# Patient Record
Sex: FEMALE | Race: BLACK OR AFRICAN AMERICAN | NOT HISPANIC OR LATINO | Employment: FULL TIME | URBAN - METROPOLITAN AREA
[De-identification: names, ages, dates, MRNs, and addresses within clinical notes are randomized per-mention and may not be internally consistent; named-entity substitution may affect disease eponyms.]

---

## 2024-05-01 ENCOUNTER — HOSPITAL ENCOUNTER (EMERGENCY)
Facility: HOSPITAL | Age: 34
Discharge: HOME | End: 2024-05-01
Attending: EMERGENCY MEDICINE
Payer: COMMERCIAL

## 2024-05-01 VITALS
RESPIRATION RATE: 16 BRPM | HEART RATE: 84 BPM | HEIGHT: 69 IN | OXYGEN SATURATION: 100 % | TEMPERATURE: 99 F | WEIGHT: 272.63 LBS | DIASTOLIC BLOOD PRESSURE: 88 MMHG | BODY MASS INDEX: 40.38 KG/M2 | SYSTOLIC BLOOD PRESSURE: 131 MMHG

## 2024-05-01 DIAGNOSIS — M79.672 LEFT FOOT PAIN: Primary | ICD-10-CM

## 2024-05-01 LAB
B-HCG UR QL: NEGATIVE
CTP QC/QA: YES

## 2024-05-01 PROCEDURE — 99283 EMERGENCY DEPT VISIT LOW MDM: CPT | Mod: 25

## 2024-05-01 PROCEDURE — 81025 URINE PREGNANCY TEST: CPT | Performed by: PHYSICIAN ASSISTANT
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
## 2024-05-15 ENCOUNTER — OFFICE VISIT (OUTPATIENT)
Dept: ORTHOPEDICS | Facility: CLINIC | Age: 34
End: 2024-05-15
Payer: COMMERCIAL

## 2024-05-15 ENCOUNTER — HOSPITAL ENCOUNTER (OUTPATIENT)
Dept: RADIOLOGY | Facility: HOSPITAL | Age: 34
Discharge: HOME | End: 2024-05-15
Attending: STUDENT IN AN ORGANIZED HEALTH CARE EDUCATION/TRAINING PROGRAM
Payer: COMMERCIAL

## 2024-05-15 VITALS
TEMPERATURE: 98 F | WEIGHT: 273.19 LBS | SYSTOLIC BLOOD PRESSURE: 123 MMHG | BODY MASS INDEX: 40.46 KG/M2 | HEIGHT: 69 IN | DIASTOLIC BLOOD PRESSURE: 78 MMHG | HEART RATE: 98 BPM

## 2024-05-15 DIAGNOSIS — S92.425A CLOSED NONDISPLACED FRACTURE OF DISTAL PHALANX OF LEFT GREAT TOE, INITIAL ENCOUNTER: Primary | ICD-10-CM

## 2024-05-15 DIAGNOSIS — M79.672 LEFT FOOT PAIN: ICD-10-CM

## 2024-05-15 PROCEDURE — 73630 X-RAY EXAM OF FOOT: CPT | Mod: TC,LT

## 2024-05-15 PROCEDURE — 99214 OFFICE O/P EST MOD 30 MIN: CPT | Mod: PBBFAC,25

## 2024-05-15 PROCEDURE — 28510 TREATMENT OF TOE FRACTURE: CPT | Mod: PBBFAC | Performed by: STUDENT IN AN ORGANIZED HEALTH CARE EDUCATION/TRAINING PROGRAM

## 2024-05-15 RX ORDER — ASCORBIC ACID 500 MG
500 TABLET ORAL DAILY
COMMUNITY

## 2024-05-15 RX ORDER — LANOLIN ALCOHOL/MO/W.PET/CERES
1 CREAM (GRAM) TOPICAL DAILY
COMMUNITY
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
## 2024-05-21 NOTE — PROGRESS NOTES
Faculty Attestation: Yocasta Killian  was seen in Sports Medicine Clinic. Discussed with Dr. Whitaker at the time of the visit. History of Present Illness, Physical Exam, and Assessment and Plan reviewed. Treatment plan is reasonable and appropriate. Compliance with treatment recommendations is important.  Radiology images independently reviewed and agree with radiologist interpretation.  No procedure was performed.     Dorian Du MD  Sports Medicine

## 2024-06-07 ENCOUNTER — HOSPITAL ENCOUNTER (OUTPATIENT)
Dept: RADIOLOGY | Facility: HOSPITAL | Age: 34
Discharge: HOME | End: 2024-06-07
Attending: STUDENT IN AN ORGANIZED HEALTH CARE EDUCATION/TRAINING PROGRAM
Payer: COMMERCIAL

## 2024-06-07 ENCOUNTER — OFFICE VISIT (OUTPATIENT)
Dept: ORTHOPEDICS | Facility: CLINIC | Age: 34
End: 2024-06-07
Payer: COMMERCIAL

## 2024-06-07 VITALS
HEART RATE: 75 BPM | HEIGHT: 69 IN | SYSTOLIC BLOOD PRESSURE: 126 MMHG | WEIGHT: 274.19 LBS | DIASTOLIC BLOOD PRESSURE: 83 MMHG | BODY MASS INDEX: 40.61 KG/M2

## 2024-06-07 DIAGNOSIS — S92.425D CLOSED NONDISPLACED FRACTURE OF DISTAL PHALANX OF LEFT GREAT TOE WITH ROUTINE HEALING, SUBSEQUENT ENCOUNTER: ICD-10-CM

## 2024-06-07 DIAGNOSIS — S92.425D CLOSED NONDISPLACED FRACTURE OF DISTAL PHALANX OF LEFT GREAT TOE WITH ROUTINE HEALING, SUBSEQUENT ENCOUNTER: Primary | ICD-10-CM

## 2024-06-07 PROCEDURE — 99213 OFFICE O/P EST LOW 20 MIN: CPT | Mod: PBBFAC,25

## 2024-06-07 PROCEDURE — 73630 X-RAY EXAM OF FOOT: CPT | Mod: TC,LT

## 2024-06-07 RX ORDER — NORELGESTROMIN AND ETHINYL ESTRADIOL 150; 35 UG/D; UG/D
1 PATCH TRANSDERMAL
COMMUNITY
Start: 2024-06-04

## 2024-06-07 NOTE — PROGRESS NOTES
"Subjective:    Patient ID: Yocasta Killian is a 33 y.o. female  who presented to [facility] for follow up.      Chief Complaint: Pain of the Left Foot      History of Present Illness:  Yocasta Killian presents to the clinic for a three-week follow up for a left closed nondisplaced comminuted fracture of the distal phalanges of the great.  Date of injury April 26 (6 weeks ago) and patient was last seen by me on May 15, 2024 where she was placed in a firm soled shoe and will tape.  Patient has been compliant with firm soled shoe and will tape.  She states that she discontinued wearing her firm sole shoe a week ago due to resolution of pain. Currently she denies any pain today at rest. She has been able to ambulate without any pain today. Denies any injury since her last visit with me.     Ankle/Foot Review of Systems:  Swelling?  no  Instability?  no  Mechanical sx?  no  <30 min AM stiffness? no  Limited ROM? no  Fever/Chills? no       Objective:      Physical Exam:    /83   Pulse 75   Ht 5' 9" (1.753 m)   Wt 124.4 kg (274 lb 3.2 oz)   BMI 40.49 kg/m²     Ortho/SPM Exam    Appearance:  Normal gait/station  FWB      Soft tissue swelling: Left: no Right: no  Effusion: Left:  Negative Right: Negative  Erythema: Left no Right: no  Ecchymosis: Left: no Right: no  Atrophy: Left: no Right: no    Palpation:  Ankle/Foot Tenderness: Left: non tender Right: non tender.    Range of motion:  Ankle dorsiflexion (20 degrees):     Left: 20 Right: 20  Ankle Plantar flexion (50 degrees): Left 50 Right: 50  Subtalar inversion (5 degrees): Left: 5 Right 5  Subtalar eversion (5 degrees):  Left: 5 Right 5  Transverse/midtarsal: adduction (20 degrees): Left: 20 Right: 20  Transverse/midtarsal abduction (10 degrees): Left: 10 Right: 10    Strength:  Foot inversion/dorsiflexion (Deep Peroneal N. L4):Left: 5/5  Pain: no Right: 5/5  Pain: no  1st toe Dorsiflexion (Deep Peroneal N. " L5): Left: 5/5  Pain: no Right: 5/5  Pain: no  Foot plantarflexion (Tibial N. S1): Left: 5/5  Pain: no Right: 5/5  Pain: no  Foot eversion (superficial peroneal L4-S1): Left: 5/5  Pain: no Right: 5/5  Pain: no      Special Tests:  Negative grind test to the left 1st great toe      General appearance: NAD  Peripheral pulses: normal bilaterally   Reflexes: Left: normal Right normal   Sensation: normal    Labs:  Last A1c: The patient doesn't have any registry metric data available     Imaging:   Previous images reviewed.  X-rays ordered and performed today: yes  # of views: 3 Laterality: bilateral  My Interpretation:  Closed nondisplaced comminuted fracture of the distal phalanges of the left great toe, fracture line is still appreciated.    Assessment:        Encounter Diagnoses   Code Name Primary?    S92.425D Closed nondisplaced fracture of distal phalanx of left great toe with routine healing, subsequent encounter Yes        Plan:        Dx:  Closed nondisplaced comminuted fracture of the distal phalanges of the left great toe (date of injury April 26- 6 weeks ago)  Treatment Plan: Discussed with patient diagnosis, prognosis, and treatment recommendations. Education provided.  Patient is clinically healed.  Discontinued postop shoe 1 week ago and has been ambulating without any pain. Discussed conservative management including avoiding aggravating activities, activity modification, oral/topical analgesics as needed, HEP, and to follow up with us as needed.   Imaging: radiological studies ordered and independently reviewed; discussed with patient; pending radiologist interpretation.   Weight Management: is paramount. Recommend to discuss with PCP about medication and bariatric surgery options for weight loss if your BMI is >35 and applicable. A BMI of <24.9 may provide further relief..   Activity: Activity as tolerated; HEP to include aerobic conditioning and strength training with non-painful activity. ROM/STG  exercises. Proper footware; assistive devises to avoid limping.   Therapy: No formal therapy  Medication: STOP all other NSAIDs. Please see your primary care physician for further refills.  RTC: PRN; call if any issues.        Global fee period: May 15, 2024     This note is dictated using the [employer] Digital Fluency Direct word recognition program. There are word recognition mistakes that are occasionally missed on review.    Kevon Whitaker D.O.  Sports Medicine Fellow